# Patient Record
Sex: MALE | Race: WHITE | Employment: OTHER
[De-identification: names, ages, dates, MRNs, and addresses within clinical notes are randomized per-mention and may not be internally consistent; named-entity substitution may affect disease eponyms.]

---

## 2024-04-24 ENCOUNTER — TELEPHONE (OUTPATIENT)
Dept: FAMILY MEDICINE CLINIC | Facility: CLINIC | Age: 49
End: 2024-04-24

## 2024-04-24 ENCOUNTER — OFFICE VISIT (OUTPATIENT)
Dept: FAMILY MEDICINE CLINIC | Facility: CLINIC | Age: 49
End: 2024-04-24
Payer: COMMERCIAL

## 2024-04-24 VITALS
HEART RATE: 64 BPM | TEMPERATURE: 97.2 F | WEIGHT: 248.2 LBS | HEIGHT: 72 IN | BODY MASS INDEX: 33.62 KG/M2 | SYSTOLIC BLOOD PRESSURE: 139 MMHG | OXYGEN SATURATION: 97 % | DIASTOLIC BLOOD PRESSURE: 88 MMHG

## 2024-04-24 DIAGNOSIS — I10 PRIMARY HYPERTENSION: ICD-10-CM

## 2024-04-24 DIAGNOSIS — E78.5 DYSLIPIDEMIA: ICD-10-CM

## 2024-04-24 DIAGNOSIS — Z79.4 TYPE 2 DIABETES MELLITUS WITHOUT COMPLICATION, WITH LONG-TERM CURRENT USE OF INSULIN (HCC): Primary | ICD-10-CM

## 2024-04-24 DIAGNOSIS — R80.9 MICROALBUMINURIA: ICD-10-CM

## 2024-04-24 DIAGNOSIS — Z98.890 HISTORY OF COLONOSCOPY: ICD-10-CM

## 2024-04-24 DIAGNOSIS — E11.9 TYPE 2 DIABETES MELLITUS WITHOUT COMPLICATION, WITH LONG-TERM CURRENT USE OF INSULIN (HCC): Primary | ICD-10-CM

## 2024-04-24 LAB
ALBUMIN, URINE, POC: 80 MG/L
CREATININE, URINE, POC: 100 MG/DL
MICROALB/CREAT RATIO, POC: ABNORMAL MG/G

## 2024-04-24 PROCEDURE — 99203 OFFICE O/P NEW LOW 30 MIN: CPT | Performed by: FAMILY MEDICINE

## 2024-04-24 PROCEDURE — 82044 UR ALBUMIN SEMIQUANTITATIVE: CPT | Performed by: FAMILY MEDICINE

## 2024-04-24 PROCEDURE — 3079F DIAST BP 80-89 MM HG: CPT | Performed by: FAMILY MEDICINE

## 2024-04-24 PROCEDURE — 3075F SYST BP GE 130 - 139MM HG: CPT | Performed by: FAMILY MEDICINE

## 2024-04-24 RX ORDER — LISINOPRIL 40 MG/1
40 TABLET ORAL DAILY
COMMUNITY
Start: 2024-02-13

## 2024-04-24 RX ORDER — ATORVASTATIN CALCIUM 80 MG/1
80 TABLET, FILM COATED ORAL DAILY
COMMUNITY
Start: 2024-01-22

## 2024-04-24 RX ORDER — INSULIN GLARGINE 100 [IU]/ML
INJECTION, SOLUTION SUBCUTANEOUS NIGHTLY
COMMUNITY

## 2024-04-24 RX ORDER — POTASSIUM CITRATE 5 MEQ/1
5 TABLET, EXTENDED RELEASE ORAL DAILY
COMMUNITY

## 2024-04-24 RX ORDER — AMLODIPINE BESYLATE 10 MG/1
10 TABLET ORAL DAILY
COMMUNITY
Start: 2024-02-13

## 2024-04-24 RX ORDER — ASPIRIN 81 MG/1
81 TABLET ORAL DAILY
COMMUNITY

## 2024-04-24 RX ORDER — FLASH GLUCOSE SENSOR
KIT MISCELLANEOUS
COMMUNITY

## 2024-04-24 SDOH — ECONOMIC STABILITY: INCOME INSECURITY: HOW HARD IS IT FOR YOU TO PAY FOR THE VERY BASICS LIKE FOOD, HOUSING, MEDICAL CARE, AND HEATING?: PATIENT DECLINED

## 2024-04-24 SDOH — ECONOMIC STABILITY: FOOD INSECURITY: WITHIN THE PAST 12 MONTHS, THE FOOD YOU BOUGHT JUST DIDN'T LAST AND YOU DIDN'T HAVE MONEY TO GET MORE.: PATIENT DECLINED

## 2024-04-24 SDOH — ECONOMIC STABILITY: HOUSING INSECURITY
IN THE LAST 12 MONTHS, WAS THERE A TIME WHEN YOU DID NOT HAVE A STEADY PLACE TO SLEEP OR SLEPT IN A SHELTER (INCLUDING NOW)?: PATIENT DECLINED

## 2024-04-24 SDOH — ECONOMIC STABILITY: FOOD INSECURITY: WITHIN THE PAST 12 MONTHS, YOU WORRIED THAT YOUR FOOD WOULD RUN OUT BEFORE YOU GOT MONEY TO BUY MORE.: PATIENT DECLINED

## 2024-04-24 ASSESSMENT — PATIENT HEALTH QUESTIONNAIRE - PHQ9
SUM OF ALL RESPONSES TO PHQ9 QUESTIONS 1 & 2: 0
2. FEELING DOWN, DEPRESSED OR HOPELESS: NOT AT ALL
1. LITTLE INTEREST OR PLEASURE IN DOING THINGS: NOT AT ALL
SUM OF ALL RESPONSES TO PHQ QUESTIONS 1-9: 0

## 2024-04-24 NOTE — PROGRESS NOTES
Subjective:   Adam Kjerstad is a 48 y.o. male presents today with medical problems and to obtain refills if necessary, and they are also meeting me as their new physician for the first time as their initial visit to discuss diabetes.  Recently moved here from [place].  He has been an insulin-dependent diabetic for about 3 years.  Interestingly he had been prediabetic according to his PCP and only mildly for a few years prior to discovering he was a diabetic.  He had received steroids for some reason and several days later was found unconscious, admitted to the hospital, where his glucose was 1400.  He left the hospital on long-acting and immediate insulin and has been on that ever since.  He only tried metformin once but had vomiting so there were no other oral medications tried by his PCP.  He only has catastrophic insurance and no pharmacy coverage so some of the newer medications including the GLP-1's, SGLT twos, DPP fours etc. were not covered by insurance.  Approximately 8 weeks ago he ran out of insulin.  Because he had been working on diet he wanted to see what his glucose numbers look like off insulin.  He can say confidently that fairly consistently his fasting sugars are between 100 and about 140.  Occasionally postprandial they will get up in the mid to high 100s but they come down fairly regularly.  Overnight lows never worse than 90, and again, this is off all insulin that he has been out of in 8 weeks.    Part 1 Diabetic Questionnaire:  1.) Are you checking your feet regularly for sores, cracks, etc? Yes  2.) Are you washing feet regularly and applying over the counter lotion? No  3.)Are you aware that you should see an eye doctor annually? Yes  4.)Are you truly watching your calories? Yes  5.)Are you aware that diabetics require a lower blood pressure and cholesterol level than those without diabetes, just to have the same stroke and heart attack risk? Yes    HTN Questionnaire:  6.) How much

## 2024-04-24 NOTE — TELEPHONE ENCOUNTER
----- Message from Ellie Lewis sent at 4/24/2024 11:47 AM EDT -----  Subject: Message to Provider    QUESTIONS  Information for Provider? Amadeo forgot to stop and ask about billing on his   way out from his appt. Please call to discuss self pay options with him.  ---------------------------------------------------------------------------  --------------  CALL BACK INFO  [phone]; OK to leave message on voicemail  ---------------------------------------------------------------------------  --------------  SCRIPT ANSWERS  Relationship to Patient? Self

## 2024-04-29 ENCOUNTER — OFFICE VISIT (OUTPATIENT)
Dept: FAMILY MEDICINE CLINIC | Facility: CLINIC | Age: 49
End: 2024-04-29

## 2024-04-29 ENCOUNTER — TELEPHONE (OUTPATIENT)
Dept: FAMILY MEDICINE CLINIC | Facility: CLINIC | Age: 49
End: 2024-04-29

## 2024-04-29 VITALS
HEIGHT: 72 IN | HEART RATE: 61 BPM | BODY MASS INDEX: 33.72 KG/M2 | TEMPERATURE: 97.6 F | SYSTOLIC BLOOD PRESSURE: 131 MMHG | DIASTOLIC BLOOD PRESSURE: 77 MMHG | OXYGEN SATURATION: 98 % | WEIGHT: 249 LBS | RESPIRATION RATE: 16 BRPM

## 2024-04-29 DIAGNOSIS — R80.9 MICROALBUMINURIA: ICD-10-CM

## 2024-04-29 DIAGNOSIS — I10 PRIMARY HYPERTENSION: ICD-10-CM

## 2024-04-29 DIAGNOSIS — N40.0 BENIGN PROSTATIC HYPERPLASIA WITHOUT LOWER URINARY TRACT SYMPTOMS: ICD-10-CM

## 2024-04-29 DIAGNOSIS — N18.1 CKD STAGE G1/A2, GFR > 90 AND ALBUMIN CREATININE RATIO 30-299 MG/G: ICD-10-CM

## 2024-04-29 DIAGNOSIS — E78.5 DYSLIPIDEMIA: ICD-10-CM

## 2024-04-29 DIAGNOSIS — E11.9 TYPE 2 DIABETES MELLITUS WITHOUT COMPLICATION, WITH LONG-TERM CURRENT USE OF INSULIN (HCC): Primary | ICD-10-CM

## 2024-04-29 DIAGNOSIS — Z79.4 TYPE 2 DIABETES MELLITUS WITHOUT COMPLICATION, WITH LONG-TERM CURRENT USE OF INSULIN (HCC): Primary | ICD-10-CM

## 2024-04-29 PROCEDURE — 99214 OFFICE O/P EST MOD 30 MIN: CPT | Performed by: FAMILY MEDICINE

## 2024-04-29 PROCEDURE — 3075F SYST BP GE 130 - 139MM HG: CPT | Performed by: FAMILY MEDICINE

## 2024-04-29 PROCEDURE — 3078F DIAST BP <80 MM HG: CPT | Performed by: FAMILY MEDICINE

## 2024-04-29 RX ORDER — PIOGLITAZONEHYDROCHLORIDE 15 MG/1
15 TABLET ORAL DAILY
Qty: 90 TABLET | Refills: 0 | Status: SHIPPED | OUTPATIENT
Start: 2024-04-29

## 2024-04-29 RX ORDER — GLIMEPIRIDE 2 MG/1
2 TABLET ORAL EVERY MORNING
Qty: 90 TABLET | Refills: 0 | Status: SHIPPED | OUTPATIENT
Start: 2024-04-29

## 2024-04-29 RX ORDER — FLASH GLUCOSE SENSOR
KIT MISCELLANEOUS
Qty: 1 EACH | Refills: 5 | Status: SHIPPED | OUTPATIENT
Start: 2024-04-29

## 2024-04-29 RX ORDER — ATORVASTATIN CALCIUM 80 MG/1
80 TABLET, FILM COATED ORAL DAILY
Qty: 90 TABLET | Refills: 0 | Status: SHIPPED | OUTPATIENT
Start: 2024-04-29

## 2024-04-29 RX ORDER — LISINOPRIL 40 MG/1
40 TABLET ORAL DAILY
Qty: 90 TABLET | Refills: 0 | Status: SHIPPED | OUTPATIENT
Start: 2024-04-29

## 2024-04-29 RX ORDER — AMLODIPINE BESYLATE 10 MG/1
10 TABLET ORAL DAILY
Qty: 90 TABLET | Refills: 0 | Status: SHIPPED | OUTPATIENT
Start: 2024-04-29

## 2024-04-29 NOTE — PATIENT INSTRUCTIONS
Assessment:  1. Type 2 diabetes mellitus without complication, with long-term current use of insulin (HCC)    2. Primary hypertension    3. Dyslipidemia    4. Microalbuminuria    5. Benign prostatic hyperplasia without lower urinary tract symptoms    6. CKD stage G1/A2, GFR > 90 and albumin creatinine ratio  mg/g        Plan:   Prescription drug management occurs today as follows:  Will adjust your diabetic medications today to attempt to get better control of your diabetes by the following changes: Discontinue the insulin.  Begin pioglitazone 15 mg a day.  Amaryl 2 mg a day.  Does not have insurance coverage so unfortunately we cannot use the SGLT2's or GLP-1's.  Cannot tolerate even 500 mg of metformin in the past.    Personal instruction is given.  For your information, consider the following: significant weight loss can result in a drop of 5-10mm of blood pressure!  The \"DASH\" diet (see bookstore or go to www.[employer] and print the DASH diet) will lower 8-14mm of pressure.    Normal blood pressure target is now at or below 130/80, preferably close to 120/80.     Kjerstad has been given the following recommendations today due to his elevated BP reading: lifestyle modifications to include: weight reduction, DASH eating plan, and increase physical activity.    1. Type 2 diabetes mellitus without complication, with long-term current use of insulin (HCC)  2. Primary hypertension  3. Dyslipidemia  4. Microalbuminuria  5. Benign prostatic hyperplasia without lower urinary tract symptoms  6. CKD stage G1/A2, GFR > 90 and albumin creatinine ratio  mg/g      Followup:  Return 2 3/4 mon for part one and two dm check.

## 2024-04-29 NOTE — PROGRESS NOTES
regarding an enlarged prostate and possible symptoms of low testosterone    AUA symptom index if administered is on flow sheet  Return in one year for annual prostate exam. We will call you back if labs are abnormal.      We spent some time reviewing recent labs, there implications, and our plan for current treatment and long term goals.  He was off his insulin at the time of this blood draw.  He had been taking his blood pressure medications and statin    Assessment:  1. Type 2 diabetes mellitus without complication, with long-term current use of insulin (HCC)    2. Primary hypertension    3. Dyslipidemia    4. Microalbuminuria    5. Benign prostatic hyperplasia without lower urinary tract symptoms    6. CKD stage G1/A2, GFR > 90 and albumin creatinine ratio  mg/g        Plan:   Prescription drug management occurs today as follows:  Will adjust your diabetic medications today to attempt to get better control of your diabetes by the following changes: Discontinue the insulin.  Begin pioglitazone 15 mg a day.  Amaryl 2 mg a day.  Does not have insurance coverage so unfortunately we cannot use the SGLT2's or GLP-1's.  Cannot tolerate even 500 mg of metformin in the past.    Personal instruction is given.  For your information, consider the following: significant weight loss can result in a drop of 5-10mm of blood pressure!  The \"DASH\" diet (see bookstore or go to [email or web address] and print the DASH diet) will lower 8-14mm of pressure.    Normal blood pressure target is now at or below 130/80, preferably close to 120/80.     Kjerstad has been given the following recommendations today due to his elevated BP reading: lifestyle modifications to include: weight reduction, DASH eating plan, and increase physical activity.    1. Type 2 diabetes mellitus without complication, with long-term current use of insulin (HCC)  2. Primary hypertension  3. Dyslipidemia  4. Microalbuminuria  5. Benign prostatic hyperplasia

## 2024-04-29 NOTE — TELEPHONE ENCOUNTER
Pharmacy called wanting clarification because it was prescribed for:  Freestyle darwin 14 day sensor but on the SIG it states freestyle darwin 3.     Would like to know which one it is.

## 2024-04-30 NOTE — TELEPHONE ENCOUNTER
Adam Kjerstad [facility] Clinical Staff (supporting Abelardo Becerril MD)14 hours ago (5:21 PM)     AK  You are actually correct on both. It is called freestyle Arnel 3, and it is a 14 day monitor.

## 2024-07-24 ENCOUNTER — OFFICE VISIT (OUTPATIENT)
Dept: FAMILY MEDICINE CLINIC | Facility: CLINIC | Age: 49
End: 2024-07-24

## 2024-07-24 VITALS
HEIGHT: 72 IN | DIASTOLIC BLOOD PRESSURE: 73 MMHG | HEART RATE: 70 BPM | SYSTOLIC BLOOD PRESSURE: 130 MMHG | RESPIRATION RATE: 16 BRPM | WEIGHT: 268 LBS | BODY MASS INDEX: 36.3 KG/M2 | TEMPERATURE: 97.8 F | OXYGEN SATURATION: 97 %

## 2024-07-24 DIAGNOSIS — N18.1 CKD STAGE G1/A2, GFR > 90 AND ALBUMIN CREATININE RATIO 30-299 MG/G: ICD-10-CM

## 2024-07-24 DIAGNOSIS — E11.9 TYPE 2 DIABETES MELLITUS WITHOUT COMPLICATION, WITH LONG-TERM CURRENT USE OF INSULIN (HCC): Primary | ICD-10-CM

## 2024-07-24 DIAGNOSIS — I10 PRIMARY HYPERTENSION: ICD-10-CM

## 2024-07-24 DIAGNOSIS — Z79.4 TYPE 2 DIABETES MELLITUS WITHOUT COMPLICATION, WITH LONG-TERM CURRENT USE OF INSULIN (HCC): Primary | ICD-10-CM

## 2024-07-24 DIAGNOSIS — E78.5 DYSLIPIDEMIA: ICD-10-CM

## 2024-07-24 PROCEDURE — 99214 OFFICE O/P EST MOD 30 MIN: CPT | Performed by: FAMILY MEDICINE

## 2024-07-24 PROCEDURE — 3052F HG A1C>EQUAL 8.0%<EQUAL 9.0%: CPT | Performed by: FAMILY MEDICINE

## 2024-07-24 PROCEDURE — 3075F SYST BP GE 130 - 139MM HG: CPT | Performed by: FAMILY MEDICINE

## 2024-07-24 PROCEDURE — 3078F DIAST BP <80 MM HG: CPT | Performed by: FAMILY MEDICINE

## 2024-07-24 RX ORDER — POTASSIUM CITRATE 5 MEQ/1
5 TABLET, EXTENDED RELEASE ORAL DAILY
Qty: 90 TABLET | Refills: 1 | Status: SHIPPED | OUTPATIENT
Start: 2024-07-24

## 2024-07-24 RX ORDER — AMLODIPINE BESYLATE 10 MG/1
10 TABLET ORAL DAILY
Qty: 90 TABLET | Refills: 1 | Status: SHIPPED | OUTPATIENT
Start: 2024-07-24

## 2024-07-24 RX ORDER — FLASH GLUCOSE SENSOR
KIT MISCELLANEOUS
Qty: 1 EACH | Refills: 5 | Status: SHIPPED | OUTPATIENT
Start: 2024-07-24

## 2024-07-24 RX ORDER — ATORVASTATIN CALCIUM 80 MG/1
80 TABLET, FILM COATED ORAL DAILY
Qty: 90 TABLET | Refills: 1 | Status: SHIPPED | OUTPATIENT
Start: 2024-07-24

## 2024-07-24 RX ORDER — PIOGLITAZONEHYDROCHLORIDE 15 MG/1
15 TABLET ORAL DAILY
Qty: 90 TABLET | Refills: 1 | Status: SHIPPED | OUTPATIENT
Start: 2024-07-24

## 2024-07-24 RX ORDER — LISINOPRIL 40 MG/1
40 TABLET ORAL DAILY
Qty: 90 TABLET | Refills: 1 | Status: SHIPPED | OUTPATIENT
Start: 2024-07-24

## 2024-07-24 NOTE — PROGRESS NOTES
intact       There are no carotid bruits  Diabetic eye exam was performed today.  Fundoscopic exam is: benign without retinopathology         Assessment:  1. Type 2 diabetes mellitus without complication, with long-term current use of insulin (HCC)    2. Primary hypertension    3. Dyslipidemia    4. CKD stage G1/A2, GFR > 90 and albumin creatinine ratio  mg/g        Plan:   Prescription drug management occurs today as follows:  Incr exerc, watch diet. Belk  Personal instruction is given.  For your information, consider the following: significant weight loss can result in a drop of 5-10mm of blood pressure!  The \"DASH\" diet (see bookstore or go to [email or web address] and print the DASH diet) will lower 8-14mm of pressure.    Normal blood pressure target is now at or below 130/80, preferably close to 120/80.     1. Type 2 diabetes mellitus without complication, with long-term current use of insulin (HCC)  2. Primary hypertension  3. Dyslipidemia  4. CKD stage G1/A2, GFR > 90 and albumin creatinine ratio  mg/g    We spent some time reviewing recent labs, there implications, and our plan for current treatment and long term goals.  I think we can continue his current medications.  Were not that far from goal where I think we could reach if he would double down on exercise, steps, [place] Mediterranean diet, outside [place] at the grocery store etc. things we talked about.  Reducing some calories.  Walking 15 minutes before or after meals.  Followup:  Return for 6 mo for dm/htn/prostate check .

## 2024-07-25 RX ORDER — GLIMEPIRIDE 2 MG/1
2 TABLET ORAL EVERY MORNING
Qty: 90 TABLET | Refills: 0 | Status: SHIPPED | OUTPATIENT
Start: 2024-07-25

## 2024-07-25 NOTE — TELEPHONE ENCOUNTER
Requested Renewals     Name from pharmacy: GLIMEPIRIDE 2MG TABLETS         Will file in chart as: glimepiride (AMARYL) 2 MG tablet    Sig: Take 1 tablet by mouth every morning    Disp: 90 tablet    Refills: 0 (Pharmacy requested: Not specified)    Start: 7/25/2024    Class: Normal    Non-formulary    Last ordered: 2 months ago (4/29/2024) by Abelardo Becerril MD    Last refill: 4/30/2024    Rx #: [id number]    Antihyperglycemics Protocol [id number]/25/2024 12:51 PM   Protocol Details Last creatinine level resulted within the past 12 months    Last GFR = or > 30 in the past 12 months    Last HgbA1c resulted within the past 6 months    Visit with authorizing provider in past 6 months or upcoming 90 days      To be filled at: [facility] #[id number] - [place], [place] - [address] - P [phone] - F [phone]

## 2024-10-29 RX ORDER — GLIMEPIRIDE 2 MG/1
2 TABLET ORAL EVERY MORNING
Qty: 90 TABLET | Refills: 0 | OUTPATIENT
Start: 2024-10-29

## 2024-10-30 RX ORDER — GLIMEPIRIDE 2 MG/1
2 TABLET ORAL EVERY MORNING
Qty: 90 TABLET | Refills: 0 | Status: SHIPPED | OUTPATIENT
Start: 2024-10-30

## 2024-10-30 NOTE — TELEPHONE ENCOUNTER
Patient called requesting refills for:      glimepiride (AMARYL) 2 MG tablet     [facility] #[id number] - [place], [place] - [address] - P [phone] - F [phone]  [address], [place] [address]  Phone: [phone]  Fax: [phone]         Patient states that he only has 4 pills left and will need enough until his next appt in Jan.    LOV 7/24/2024  NOV 1/23/2025    Please advise

## 2025-01-10 RX ORDER — LISINOPRIL 40 MG/1
40 TABLET ORAL DAILY
Qty: 90 TABLET | Refills: 1 | OUTPATIENT
Start: 2025-01-10

## 2025-01-22 SDOH — ECONOMIC STABILITY: TRANSPORTATION INSECURITY
IN THE PAST 12 MONTHS, HAS LACK OF TRANSPORTATION KEPT YOU FROM MEETINGS, WORK, OR FROM GETTING THINGS NEEDED FOR DAILY LIVING?: NO

## 2025-01-22 SDOH — ECONOMIC STABILITY: FOOD INSECURITY: WITHIN THE PAST 12 MONTHS, THE FOOD YOU BOUGHT JUST DIDN'T LAST AND YOU DIDN'T HAVE MONEY TO GET MORE.: NEVER TRUE

## 2025-01-22 SDOH — ECONOMIC STABILITY: INCOME INSECURITY: IN THE LAST 12 MONTHS, WAS THERE A TIME WHEN YOU WERE NOT ABLE TO PAY THE MORTGAGE OR RENT ON TIME?: NO

## 2025-01-22 SDOH — ECONOMIC STABILITY: TRANSPORTATION INSECURITY
IN THE PAST 12 MONTHS, HAS THE LACK OF TRANSPORTATION KEPT YOU FROM MEDICAL APPOINTMENTS OR FROM GETTING MEDICATIONS?: NO

## 2025-01-22 SDOH — ECONOMIC STABILITY: FOOD INSECURITY: WITHIN THE PAST 12 MONTHS, YOU WORRIED THAT YOUR FOOD WOULD RUN OUT BEFORE YOU GOT MONEY TO BUY MORE.: NEVER TRUE

## 2025-01-23 ENCOUNTER — OFFICE VISIT (OUTPATIENT)
Dept: FAMILY MEDICINE CLINIC | Facility: CLINIC | Age: 50
End: 2025-01-23

## 2025-01-23 VITALS
OXYGEN SATURATION: 96 % | SYSTOLIC BLOOD PRESSURE: 148 MMHG | WEIGHT: 273 LBS | HEART RATE: 70 BPM | RESPIRATION RATE: 18 BRPM | HEIGHT: 72 IN | DIASTOLIC BLOOD PRESSURE: 98 MMHG | TEMPERATURE: 97.7 F | BODY MASS INDEX: 36.98 KG/M2

## 2025-01-23 DIAGNOSIS — Z79.4 TYPE 2 DIABETES MELLITUS WITHOUT COMPLICATION, WITH LONG-TERM CURRENT USE OF INSULIN (HCC): Primary | ICD-10-CM

## 2025-01-23 DIAGNOSIS — N40.0 BENIGN PROSTATIC HYPERPLASIA WITHOUT LOWER URINARY TRACT SYMPTOMS: ICD-10-CM

## 2025-01-23 DIAGNOSIS — I10 PRIMARY HYPERTENSION: ICD-10-CM

## 2025-01-23 DIAGNOSIS — R80.9 MICROALBUMINURIA: ICD-10-CM

## 2025-01-23 DIAGNOSIS — N18.1 CKD STAGE G1/A2, GFR > 90 AND ALBUMIN CREATININE RATIO 30-299 MG/G: ICD-10-CM

## 2025-01-23 DIAGNOSIS — E11.9 TYPE 2 DIABETES MELLITUS WITHOUT COMPLICATION, WITH LONG-TERM CURRENT USE OF INSULIN (HCC): Primary | ICD-10-CM

## 2025-01-23 DIAGNOSIS — E78.5 DYSLIPIDEMIA: ICD-10-CM

## 2025-01-23 PROCEDURE — 3080F DIAST BP >= 90 MM HG: CPT | Performed by: FAMILY MEDICINE

## 2025-01-23 PROCEDURE — 3077F SYST BP >= 140 MM HG: CPT | Performed by: FAMILY MEDICINE

## 2025-01-23 PROCEDURE — 99214 OFFICE O/P EST MOD 30 MIN: CPT | Performed by: FAMILY MEDICINE

## 2025-01-23 PROCEDURE — 3046F HEMOGLOBIN A1C LEVEL >9.0%: CPT | Performed by: FAMILY MEDICINE

## 2025-01-23 RX ORDER — GLIMEPIRIDE 2 MG/1
2 TABLET ORAL EVERY MORNING
Qty: 90 TABLET | Refills: 1 | Status: SHIPPED | OUTPATIENT
Start: 2025-01-23

## 2025-01-23 RX ORDER — POTASSIUM CITRATE 5 MEQ/1
5 TABLET, EXTENDED RELEASE ORAL DAILY
Qty: 90 TABLET | Refills: 1 | Status: SHIPPED | OUTPATIENT
Start: 2025-01-23

## 2025-01-23 RX ORDER — PIOGLITAZONE 15 MG/1
15 TABLET ORAL DAILY
Qty: 90 TABLET | Refills: 1 | Status: SHIPPED | OUTPATIENT
Start: 2025-01-23

## 2025-01-23 RX ORDER — AMLODIPINE BESYLATE 10 MG/1
10 TABLET ORAL DAILY
Qty: 90 TABLET | Refills: 1 | Status: SHIPPED | OUTPATIENT
Start: 2025-01-23

## 2025-01-23 RX ORDER — ATORVASTATIN CALCIUM 80 MG/1
80 TABLET, FILM COATED ORAL DAILY
Qty: 90 TABLET | Refills: 1 | Status: SHIPPED | OUTPATIENT
Start: 2025-01-23

## 2025-01-23 RX ORDER — LISINOPRIL 40 MG/1
40 TABLET ORAL DAILY
Qty: 90 TABLET | Refills: 1 | Status: SHIPPED | OUTPATIENT
Start: 2025-01-23

## 2025-01-23 RX ORDER — FLASH GLUCOSE SENSOR
KIT MISCELLANEOUS
Qty: 1 EACH | Refills: 5 | Status: SHIPPED | OUTPATIENT
Start: 2025-01-23

## 2025-01-23 ASSESSMENT — PATIENT HEALTH QUESTIONNAIRE - PHQ9
SUM OF ALL RESPONSES TO PHQ QUESTIONS 1-9: 0
SUM OF ALL RESPONSES TO PHQ9 QUESTIONS 1 & 2: 0
1. LITTLE INTEREST OR PLEASURE IN DOING THINGS: NOT AT ALL
SUM OF ALL RESPONSES TO PHQ QUESTIONS 1-9: 0
2. FEELING DOWN, DEPRESSED OR HOPELESS: NOT AT ALL
SUM OF ALL RESPONSES TO PHQ QUESTIONS 1-9: 0
SUM OF ALL RESPONSES TO PHQ QUESTIONS 1-9: 0

## 2025-01-23 NOTE — PROGRESS NOTES
Subjective:  Adam Kjerstad is a 49 y.o. male presents today for their semi-annual diabetic visit.    They are also due for their semiannual hypertension visit and They are also due for their cholesterol/lipids check  PHQ-9 Total Score: 0 (1/23/2025  2:29 PM)      Systems review of cardiovascular and pulmonary systems reveal no complaints or pertinent postivies.  Unfortunately did not take his medications the last 2 or 3 days for blood pressure.  Comes in today for recheck of diabetic numbers which he brings in from an outside lab.  Unable to afford brand-name diabetic medicines and cannot tolerate metformin so his only medicine has been Amaryl and pioglitazone  Allergies   Allergen Reactions    Metformin And Related Diarrhea      reports that he has never smoked. He has never used smokeless tobacco.    No results found for: \"NA\", \"K\", \"CL\", \"CO2\", \"BUN\", \"CREATININE\", \"GLUCOSE\", \"CALCIUM\"   Lab Results   Component Value Date    LABA1C 9.9 01/16/2025     No results found for: \"EAG\"    BP Readings from Last 3 Encounters:   01/23/25 (!) 148/98   07/24/24 130/73   04/29/24 131/77       Objective:  Blood pressure (!) 148/98, pulse 70, temperature 97.7 °F (36.5 °C), resp. rate 18, height 1.829 m (6'), weight 123.8 kg (273 lb), SpO2 96%.  Body mass index is 37.03 kg/m².   General- pleasant, no distress  Psych- alert and oriented to person, place and time  Mood and affect are appropriate to the visit  rrr s mrg.   bcta  Skin with shoes and socks off, inspection of feet under good care, no breakdown evident.  Diabetic foot exam:   Left Foot:   Visual Exam: within normal limits for age   Pulse DP: present   Filament test: sensation appropriate   Vibratory Sensation intact  Right Foot:   Visual Exam: within normal limits for age   Pulse DP: present   Filament test: sensation appropriate   Vibratory Sensation intact       There are no carotid bruits  Diabetic eye exam was performed today.  Fundoscopic exam is: benign without

## 2025-07-16 ENCOUNTER — OFFICE VISIT (OUTPATIENT)
Dept: FAMILY MEDICINE CLINIC | Facility: CLINIC | Age: 50
End: 2025-07-16

## 2025-07-16 VITALS
HEIGHT: 72 IN | RESPIRATION RATE: 18 BRPM | DIASTOLIC BLOOD PRESSURE: 79 MMHG | WEIGHT: 276 LBS | TEMPERATURE: 97.5 F | SYSTOLIC BLOOD PRESSURE: 132 MMHG | BODY MASS INDEX: 37.38 KG/M2 | OXYGEN SATURATION: 96 % | HEART RATE: 74 BPM

## 2025-07-16 DIAGNOSIS — Z79.4 TYPE 2 DIABETES MELLITUS WITH HYPERGLYCEMIA, WITH LONG-TERM CURRENT USE OF INSULIN (HCC): Primary | ICD-10-CM

## 2025-07-16 DIAGNOSIS — E11.65 TYPE 2 DIABETES MELLITUS WITH HYPERGLYCEMIA, WITH LONG-TERM CURRENT USE OF INSULIN (HCC): Primary | ICD-10-CM

## 2025-07-16 DIAGNOSIS — R80.9 MICROALBUMINURIA: ICD-10-CM

## 2025-07-16 DIAGNOSIS — N18.1 CKD STAGE G1/A2, GFR > 90 AND ALBUMIN CREATININE RATIO 30-299 MG/G: ICD-10-CM

## 2025-07-16 DIAGNOSIS — I10 PRIMARY HYPERTENSION: ICD-10-CM

## 2025-07-16 DIAGNOSIS — E11.9 TYPE 2 DIABETES MELLITUS WITHOUT COMPLICATION, WITH LONG-TERM CURRENT USE OF INSULIN (HCC): ICD-10-CM

## 2025-07-16 DIAGNOSIS — Z79.4 TYPE 2 DIABETES MELLITUS WITHOUT COMPLICATION, WITH LONG-TERM CURRENT USE OF INSULIN (HCC): ICD-10-CM

## 2025-07-16 DIAGNOSIS — E78.5 DYSLIPIDEMIA: ICD-10-CM

## 2025-07-16 PROCEDURE — 3075F SYST BP GE 130 - 139MM HG: CPT | Performed by: FAMILY MEDICINE

## 2025-07-16 PROCEDURE — 99214 OFFICE O/P EST MOD 30 MIN: CPT | Performed by: FAMILY MEDICINE

## 2025-07-16 PROCEDURE — 3078F DIAST BP <80 MM HG: CPT | Performed by: FAMILY MEDICINE

## 2025-07-16 PROCEDURE — 3046F HEMOGLOBIN A1C LEVEL >9.0%: CPT | Performed by: FAMILY MEDICINE

## 2025-07-16 RX ORDER — AMLODIPINE BESYLATE 10 MG/1
10 TABLET ORAL DAILY
Qty: 90 TABLET | Refills: 1 | Status: SHIPPED | OUTPATIENT
Start: 2025-07-16

## 2025-07-16 RX ORDER — FLASH GLUCOSE SENSOR
KIT MISCELLANEOUS
Qty: 1 EACH | Refills: 5 | Status: SHIPPED | OUTPATIENT
Start: 2025-07-16

## 2025-07-16 RX ORDER — AMLODIPINE BESYLATE 10 MG/1
10 TABLET ORAL DAILY
Qty: 90 TABLET | Refills: 1 | Status: CANCELLED | OUTPATIENT
Start: 2025-07-16

## 2025-07-16 RX ORDER — ATORVASTATIN CALCIUM 80 MG/1
80 TABLET, FILM COATED ORAL DAILY
Qty: 90 TABLET | Refills: 1 | Status: CANCELLED | OUTPATIENT
Start: 2025-07-16

## 2025-07-16 RX ORDER — LISINOPRIL 40 MG/1
40 TABLET ORAL DAILY
Qty: 90 TABLET | Refills: 1 | Status: SHIPPED | OUTPATIENT
Start: 2025-07-16

## 2025-07-16 RX ORDER — PIOGLITAZONE 15 MG/1
15 TABLET ORAL DAILY
Qty: 90 TABLET | Refills: 1 | Status: CANCELLED | OUTPATIENT
Start: 2025-07-16

## 2025-07-16 RX ORDER — LISINOPRIL 40 MG/1
40 TABLET ORAL DAILY
Qty: 90 TABLET | Refills: 1 | Status: CANCELLED | OUTPATIENT
Start: 2025-07-16

## 2025-07-16 RX ORDER — POTASSIUM CITRATE 540 MG/1
5 TABLET, EXTENDED RELEASE ORAL DAILY
Qty: 90 TABLET | Refills: 1 | Status: CANCELLED | OUTPATIENT
Start: 2025-07-16

## 2025-07-16 RX ORDER — POTASSIUM CITRATE 540 MG/1
5 TABLET, EXTENDED RELEASE ORAL DAILY
Qty: 90 TABLET | Refills: 1 | Status: SHIPPED | OUTPATIENT
Start: 2025-07-16

## 2025-07-16 RX ORDER — FLASH GLUCOSE SENSOR
KIT MISCELLANEOUS
Qty: 1 EACH | Refills: 5 | Status: CANCELLED | OUTPATIENT
Start: 2025-07-16

## 2025-07-16 RX ORDER — GLIMEPIRIDE 2 MG/1
2 TABLET ORAL EVERY MORNING
Qty: 90 TABLET | Refills: 1 | Status: CANCELLED | OUTPATIENT
Start: 2025-07-16

## 2025-07-16 RX ORDER — PIOGLITAZONE 15 MG/1
15 TABLET ORAL DAILY
Qty: 90 TABLET | Refills: 1 | Status: SHIPPED | OUTPATIENT
Start: 2025-07-16

## 2025-07-16 RX ORDER — GLIMEPIRIDE 2 MG/1
2 TABLET ORAL EVERY MORNING
Qty: 30 TABLET | Refills: 5 | Status: SHIPPED | OUTPATIENT
Start: 2025-07-16

## 2025-07-16 RX ORDER — ATORVASTATIN CALCIUM 80 MG/1
80 TABLET, FILM COATED ORAL DAILY
Qty: 90 TABLET | Refills: 1 | Status: SHIPPED | OUTPATIENT
Start: 2025-07-16

## 2025-07-16 ASSESSMENT — PATIENT HEALTH QUESTIONNAIRE - PHQ9
SUM OF ALL RESPONSES TO PHQ QUESTIONS 1-9: 0
2. FEELING DOWN, DEPRESSED OR HOPELESS: NOT AT ALL
1. LITTLE INTEREST OR PLEASURE IN DOING THINGS: NOT AT ALL
SUM OF ALL RESPONSES TO PHQ QUESTIONS 1-9: 0

## 2025-07-16 NOTE — PROGRESS NOTES
Subjective:  Adam Kjerstad is a 49 y.o. male presents today for their semi-annual diabetic visit.    They are also due for their semiannual hypertension visit  Systems review of cardiovascular and pulmonary systems reveal no complaints or pertinent postivies.  Allergies   Allergen Reactions    Metformin And Related Diarrhea      reports that he has never smoked. He has never used smokeless tobacco.    No results found for: \"NA\", \"K\", \"CL\", \"CO2\", \"BUN\", \"CREATININE\", \"GLUCOSE\", \"CALCIUM\"   Lab Results   Component Value Date    LABA1C 9.9 01/16/2025     No results found for: \"EAG\"    BP Readings from Last 3 Encounters:   07/16/25 132/79   01/23/25 (!) 148/98   07/24/24 130/73   Labs were done outside facility.  Fbs was 258   A1c was 10.8%. GFR was 101    Objective:  Blood pressure 132/79, pulse 74, temperature 97.5 °F (36.4 °C), resp. rate 18, height 1.829 m (6'), weight 125.2 kg (276 lb), SpO2 96%.  Body mass index is 37.43 kg/m².   General- pleasant, no distress  Psych- alert and oriented to person, place and time  Mood and affect are appropriate to the visit  rrr s mrg.   bcta  Skin with shoes and socks off, inspection of feet under good care, no breakdown evident.  Diabetic foot exam:   Left Foot:   Visual Exam: within normal limits for age   Pulse DP: present   Filament test: sensation appropriate   Vibratory Sensation intact  Right Foot:   Visual Exam: within normal limits for age   Pulse DP: present   Filament test: sensation appropriate   Vibratory Sensation intact       There are no carotid bruits  Diabetic eye exam was performed today.  Fundoscopic exam is: benign without retinopathology     We spent some time reviewing recent labs, there implications, and our plan for current treatment and long term goals.    Assessment:  1. Type 2 diabetes mellitus with hyperglycemia, with long-term current use of insulin (HCC)    2. Primary hypertension    3. Dyslipidemia    4. CKD stage G1/A2, GFR > 90 and albumin